# Patient Record
Sex: FEMALE | Race: WHITE | NOT HISPANIC OR LATINO | Employment: OTHER | URBAN - METROPOLITAN AREA
[De-identification: names, ages, dates, MRNs, and addresses within clinical notes are randomized per-mention and may not be internally consistent; named-entity substitution may affect disease eponyms.]

---

## 2023-05-10 DIAGNOSIS — E78.9 BORDERLINE HIGH CHOLESTEROL: ICD-10-CM

## 2023-05-10 NOTE — TELEPHONE ENCOUNTER
Patient needs a medication refill on Pravastatin 40 mg and levothyroxine 112 mcg sent to [address], [place] [address] phone number [phone]

## 2023-05-11 RX ORDER — LEVOTHYROXINE SODIUM 112 UG/1
112 TABLET ORAL DAILY
Qty: 90 TABLET | Refills: 0 | Status: SHIPPED | OUTPATIENT
Start: 2023-05-11

## 2023-05-11 RX ORDER — LEVOTHYROXINE SODIUM 112 UG/1
112 TABLET ORAL DAILY
COMMUNITY
End: 2023-05-11 | Stop reason: SDUPTHER

## 2023-05-11 RX ORDER — PRAVASTATIN SODIUM 40 MG/1
1 TABLET ORAL NIGHTLY
COMMUNITY
Start: 2022-09-02 | End: 2023-05-11 | Stop reason: SDUPTHER

## 2023-05-11 RX ORDER — PRAVASTATIN SODIUM 40 MG/1
40 TABLET ORAL NIGHTLY
Qty: 90 TABLET | Refills: 0 | Status: SHIPPED | OUTPATIENT
Start: 2023-05-11 | End: 2023-10-17 | Stop reason: SDUPTHER

## 2023-08-18 ENCOUNTER — APPOINTMENT (OUTPATIENT)
Dept: PRIMARY CARE | Facility: CLINIC | Age: 68
End: 2023-08-18
Payer: COMMERCIAL

## 2023-08-23 ENCOUNTER — APPOINTMENT (OUTPATIENT)
Dept: PRIMARY CARE | Facility: CLINIC | Age: 68
End: 2023-08-23
Payer: COMMERCIAL

## 2023-10-17 ENCOUNTER — OFFICE VISIT (OUTPATIENT)
Dept: PRIMARY CARE | Facility: CLINIC | Age: 68
End: 2023-10-17
Payer: COMMERCIAL

## 2023-10-17 VITALS
BODY MASS INDEX: 24.14 KG/M2 | HEART RATE: 75 BPM | HEIGHT: 62 IN | RESPIRATION RATE: 20 BRPM | WEIGHT: 131.2 LBS | OXYGEN SATURATION: 95 %

## 2023-10-17 DIAGNOSIS — F41.9 ANXIETY: ICD-10-CM

## 2023-10-17 DIAGNOSIS — E78.9 LIPID DISORDER: ICD-10-CM

## 2023-10-17 DIAGNOSIS — E78.9 BORDERLINE HIGH CHOLESTEROL: ICD-10-CM

## 2023-10-17 DIAGNOSIS — Z00.00 ROUTINE GENERAL MEDICAL EXAMINATION AT A HEALTH CARE FACILITY: Primary | ICD-10-CM

## 2023-10-17 PROBLEM — M79.10 MYALGIA: Status: ACTIVE | Noted: 2023-10-17

## 2023-10-17 PROBLEM — E03.9 ACQUIRED HYPOTHYROIDISM: Status: ACTIVE | Noted: 2023-10-17

## 2023-10-17 PROBLEM — R91.1 LUNG NODULE: Status: ACTIVE | Noted: 2023-10-17

## 2023-10-17 PROBLEM — R92.2 DENSE BREASTS: Status: ACTIVE | Noted: 2023-10-17

## 2023-10-17 PROBLEM — H35.369 DRUSEN OF EYE: Status: ACTIVE | Noted: 2023-10-17

## 2023-10-17 PROBLEM — D22.5 MELANOCYTIC NEVI OF TRUNK: Status: ACTIVE | Noted: 2021-02-08

## 2023-10-17 PROBLEM — N81.2 INCOMPLETE UTEROVAGINAL PROLAPSE: Status: ACTIVE | Noted: 2023-10-17

## 2023-10-17 PROBLEM — R05.8 UPPER AIRWAY COUGH SYNDROME: Status: ACTIVE | Noted: 2023-10-17

## 2023-10-17 PROBLEM — D22.60 MELANOCYTIC NEVI OF UNSPECIFIED UPPER LIMB, INCLUDING SHOULDER: Status: ACTIVE | Noted: 2021-02-08

## 2023-10-17 PROBLEM — D48.5 NEOPLASM OF UNCERTAIN BEHAVIOR OF SKIN: Status: ACTIVE | Noted: 2021-02-08

## 2023-10-17 PROBLEM — R20.2 NUMBNESS AND TINGLING OF FOOT: Status: ACTIVE | Noted: 2023-10-17

## 2023-10-17 PROBLEM — R20.0 NUMBNESS AND TINGLING OF FOOT: Status: ACTIVE | Noted: 2023-10-17

## 2023-10-17 PROBLEM — R30.0 DYSURIA: Status: ACTIVE | Noted: 2023-10-17

## 2023-10-17 PROBLEM — L57.9 SKIN CHANGES DUE TO CHRONIC EXPOSURE TO NONIONIZING RADIATION, UNSPECIFIED: Status: ACTIVE | Noted: 2021-02-08

## 2023-10-17 PROBLEM — S49.92XA INJURY OF LEFT SHOULDER: Status: ACTIVE | Noted: 2023-10-17

## 2023-10-17 PROBLEM — R31.29 MICROSCOPIC HEMATURIA: Status: ACTIVE | Noted: 2023-10-17

## 2023-10-17 PROBLEM — R92.30 DENSE BREASTS: Status: ACTIVE | Noted: 2023-10-17

## 2023-10-17 PROBLEM — E55.9 VITAMIN D DEFICIENCY: Status: ACTIVE | Noted: 2023-10-17

## 2023-10-17 PROBLEM — M81.0 OSTEOPOROSIS: Status: ACTIVE | Noted: 2023-10-17

## 2023-10-17 PROBLEM — N32.81 OAB (OVERACTIVE BLADDER): Status: ACTIVE | Noted: 2023-10-17

## 2023-10-17 PROBLEM — D22.70 MELANOCYTIC NEVI OF UNSPECIFIED LOWER LIMB, INCLUDING HIP: Status: ACTIVE | Noted: 2021-02-08

## 2023-10-17 PROBLEM — R06.02 SOB (SHORTNESS OF BREATH): Status: ACTIVE | Noted: 2023-10-17

## 2023-10-17 PROBLEM — E78.5 HYPERLIPIDEMIA: Status: ACTIVE | Noted: 2023-10-17

## 2023-10-17 PROBLEM — L81.4 OTHER MELANIN HYPERPIGMENTATION: Status: ACTIVE | Noted: 2021-02-08

## 2023-10-17 PROBLEM — N81.11 CYSTOCELE, MIDLINE: Status: ACTIVE | Noted: 2023-10-17

## 2023-10-17 PROBLEM — L82.1 OTHER SEBORRHEIC KERATOSIS: Status: ACTIVE | Noted: 2021-02-08

## 2023-10-17 PROBLEM — H93.12 SUBJECTIVE TINNITUS, LEFT: Status: ACTIVE | Noted: 2023-10-17

## 2023-10-17 PROBLEM — C73 CARCINOMA OF THYROID GLAND (MULTI): Status: ACTIVE | Noted: 2023-10-17

## 2023-10-17 PROBLEM — S46.002A INJURY OF LEFT ROTATOR CUFF: Status: ACTIVE | Noted: 2023-10-17

## 2023-10-17 PROBLEM — G47.00 INSOMNIA: Status: ACTIVE | Noted: 2023-10-17

## 2023-10-17 PROBLEM — I88.9 LYMPHADENITIS: Status: ACTIVE | Noted: 2023-10-17

## 2023-10-17 PROBLEM — D22.39 MELANOCYTIC NEVI OF OTHER PARTS OF FACE: Status: ACTIVE | Noted: 2021-02-08

## 2023-10-17 PROBLEM — D18.01 HEMANGIOMA OF SKIN AND SUBCUTANEOUS TISSUE: Status: ACTIVE | Noted: 2021-02-08

## 2023-10-17 PROBLEM — L57.0 ACTINIC KERATOSIS: Status: ACTIVE | Noted: 2021-02-08

## 2023-10-17 PROCEDURE — 1159F MED LIST DOCD IN RCRD: CPT | Performed by: INTERNAL MEDICINE

## 2023-10-17 PROCEDURE — 1036F TOBACCO NON-USER: CPT | Performed by: INTERNAL MEDICINE

## 2023-10-17 PROCEDURE — 1126F AMNT PAIN NOTED NONE PRSNT: CPT | Performed by: INTERNAL MEDICINE

## 2023-10-17 PROCEDURE — G0439 PPPS, SUBSEQ VISIT: HCPCS | Performed by: INTERNAL MEDICINE

## 2023-10-17 PROCEDURE — 1160F RVW MEDS BY RX/DR IN RCRD: CPT | Performed by: INTERNAL MEDICINE

## 2023-10-17 PROCEDURE — 81001 URINALYSIS AUTO W/SCOPE: CPT

## 2023-10-17 RX ORDER — ESCITALOPRAM OXALATE 10 MG/1
10 TABLET ORAL DAILY
Qty: 30 TABLET | Refills: 2 | Status: SHIPPED | OUTPATIENT
Start: 2023-10-17 | End: 2024-01-15

## 2023-10-17 RX ORDER — VIT C/E/ZN/COPPR/LUTEIN/ZEAXAN 250MG-90MG
CAPSULE ORAL
COMMUNITY
Start: 2013-11-12

## 2023-10-17 RX ORDER — ESCITALOPRAM OXALATE 10 MG/1
10 TABLET ORAL DAILY
COMMUNITY
End: 2023-10-17 | Stop reason: SDUPTHER

## 2023-10-17 RX ORDER — PRAVASTATIN SODIUM 40 MG/1
40 TABLET ORAL NIGHTLY
Qty: 90 TABLET | Refills: 3 | Status: SHIPPED | OUTPATIENT
Start: 2023-10-17 | End: 2024-10-16

## 2023-10-17 ASSESSMENT — PATIENT HEALTH QUESTIONNAIRE - PHQ9
SUM OF ALL RESPONSES TO PHQ9 QUESTIONS 1 AND 2: 0
2. FEELING DOWN, DEPRESSED OR HOPELESS: NOT AT ALL
1. LITTLE INTEREST OR PLEASURE IN DOING THINGS: NOT AT ALL

## 2023-10-17 ASSESSMENT — ENCOUNTER SYMPTOMS
LOSS OF SENSATION IN FEET: 0
OCCASIONAL FEELINGS OF UNSTEADINESS: 0
DEPRESSION: 0

## 2023-10-17 NOTE — PROGRESS NOTES
Chief Complaint: Medicare Wellness Exam/Comprehensive Problem Focused Follow Up and Physical Exam    Awaiting Bladder prolapse surgery with Dr Lionel Lopez at [facility]    HPI:    ATS    Hypothyroid    HLD      Active Problem List      Comprehensive Medical/Surgical/Social/Family History  Past Medical History:   Diagnosis Date    Acute sinusitis, unspecified 2013    Acute sinusitis    Acute upper respiratory infection, unspecified 2014    Acute URI    Acute vaginitis 2017    Acute vaginitis    Candidiasis, unspecified 2017    Yeast infection    Encounter for gynecological examination (general) (routine) without abnormal findings 10/10/2016    Pap smear, as part of routine gynecological examination    Localized swelling, mass and lump, neck     Neck mass    Other conditions influencing health status 2018    History of cough    Personal history of malignant neoplasm of thyroid     History of malignant neoplasm of thyroid    Personal history of other diseases of the nervous system and sense organs 2016    History of acute otitis media    Personal history of other diseases of the nervous system and sense organs 2016    History of serous otitis media    Personal history of other diseases of the respiratory system 10/31/2018    History of sinusitis    Personal history of other diseases of urinary system 2018    History of hematuria    Personal history of other specified conditions 2016    History of flank pain     Past Surgical History:   Procedure Laterality Date     SECTION, CLASSIC  10/27/2014     Section    COLONOSCOPY  10/10/2016    Complete Colonoscopy    RHINOPLASTY  10/10/2016    Rhinoplasty    TOTAL THYROIDECTOMY  2014    Thyroid Surgery Total Thyroidectomy     Social History     Social History Narrative    Not on file         Allergies and Medications  Rosuvastatin and Sulfa (sulfonamide antibiotics)  Current Outpatient Medications on File  "Prior to Visit   Medication Sig Dispense Refill    cholecalciferol (Vitamin D-3) 25 MCG (1000 UT) capsule Take by mouth.      cyanocobalamin (Vitamin B-12) 50 mcg tablet Take 1 tablet (50 mcg) by mouth once daily.      levothyroxine (Synthroid, Levoxyl) 112 mcg tablet Take 1 tablet (112 mcg) by mouth once daily. 90 tablet 0    [DISCONTINUED] escitalopram (Lexapro) 10 mg tablet Take 1 tablet (10 mg) by mouth once daily.      [DISCONTINUED] magnesium 30 mg tablet Take 1 tablet (30 mg) by mouth 2 times a day.      [DISCONTINUED] pravastatin (Pravachol) 40 mg tablet Take 1 tablet (40 mg) by mouth once daily at bedtime. 90 tablet 0     No current facility-administered medications on file prior to visit.       Medications and Supplements  prescribed by me and other practitioners or clinical pharmacist (such as prescriptions, OTC's, herbal therapies and supplements) were reviewed and documented in the medical record.    Tobacco/Alcohol/Opioid use, as well as Illicit Drug Use was screened for/reviewed and documented in Social History section and medication list as appropriate  Activities of Daily Living  In your present state of health, do you have any difficulty performing the following activities?:   Preparing food and eating?: No  Bathing yourself: No  Getting dressed: No  Using the toilet:No  Moving around from place to place: No  In the past year have you fallen or had a near fall?:No    Depression Screen  (Note: if answer to either of the following is \"Yes\", then a more complete depression screening is indicated)   Q1: Over the past two weeks, have you felt down, depressed or hopeless? No  Q2: Over the past two weeks, have you felt little interest or pleasure in doing things? No    Current exercise habits: Home exercise routine includes stretching.   Dietary issues discussed: Yes  Hearing difficulties: No  Safe in current home environment: yes  Visual Acuity assessed: no  Cognitive Impairment assessed: no   " "    Advance directives  Advanced Care Planning (including a Living Will, Healthcare POA, as well as specific end of life choices and/or directives), was discussed for approximately 16 minutes with the patient and/or surrogate, voluntarily, and documented in the medical record.     Cardiac Risk Assessment  Cardiovascular risk was discussed and, if needed, lifestyle modifications recommended, including nutritional choices, exercise, and elimination of habits contributing to risk. We agreed on a plan to reduce the current cardiovascular risk based on above discussion as needed.  Aspirin use/disuse was discussed after reviewing the updated guidelines below:    Consider low dose Aspirin ( mg) use if the benefit for cardiovascular disease prevention outweighs risk for bleeding complications.   In general, low dose ASA should be considered:  In patients WITHOUT prior MI/stroke/PAD (primary prevention):   a. Age <60: Use if 10-year cardiovascular disease risk >20%, with discussion of risks and benefits with patient  b. Age 60-<70: Use if 10-year cardiovascular disease risk >20% and low bleeding (e.g., gastrointenstinal) risk, with discussion of risks and benefits with patient  c. Age >=70: Do not use    In patients WITH prior MI/stroke/PAD (secondary prevention):   Generally use unless extremely high bleeding (e.g., gastrointenstinal) risk, with discussion of risks and benefits with patient    ROS otherwise negative aside from what was mentioned above in HPI.    Vitals  Pulse 75   Resp 20   Ht 1.563 m (5' 1.54\")   Wt 59.5 kg (131 lb 3.2 oz)   SpO2 95%   BMI 24.36 kg/m²   Body mass index is 24.36 kg/m².  Physical Exam  Gen: Alert, NAD  HEENT:  PERRLA, EOMI, conjunctiva and sclera normal in appearance. External auditory canals/TMs normal; Oral cavity and posterior pharynx without lesions/exudate  Neck:  Supple with FROM; No masses/nodes palpable; Thyroid nontender and without nodules; No ANNI  Respiratory:  Lungs " CTAB  Cardiovascular:  Heart RRR. No M/R/G. Peripheral pulses equal bilaterally  Abdomen:  Soft, nontender, BS present throughout; No R/G/R; No HSM or masses palpated  Extremities:  FROM all extremities; Muscle strength grossly normal with good tone  Neuro:  CN II-XII intact; Reflexes 2+/2+; Gross motor and sensory intact  Skin:  No suspicious lesions present  Breast: No masses, skin lesions or nipple discharges, no axillary lymphadenopathy    Assessment and Plan:  Problem List Items Addressed This Visit    None  Visit Diagnoses       Lipid disorder    -  Primary    Routine general medical examination at a health care facility        Relevant Medications    magnesium 30 mg tablet    Other Relevant Orders    TSH with reflex to Free T4 if abnormal    Lipid Panel    Comprehensive Metabolic Panel    CBC and Auto Differential    Vitamin D 25-Hydroxy,Total (for eval of Vitamin D levels)    Hemoglobin A1c    Urinalysis with Reflex Microscopic    Borderline high cholesterol        Relevant Medications    pravastatin (Pravachol) 40 mg tablet    Anxiety        Relevant Medications    escitalopram (Lexapro) 10 mg tablet          Thyroid [id number] with endocrinology continue with Synthroid 112 mcg daily    Hyperlipidemia    Anxiety/mood disorder-stable.  Continue with Lexapro Rx    CRC-current    Continue with current Rx    Follow up/ Call with any concerns    Follow up in 12 months /PRN        During the course of the visit the patient was educated and counseled about age appropriate screening and preventive services. Completed preventive screenings were documented in the chart and orders were placed for outstanding screenings/procedures as documented in the Assessment and Plan.      Patient Instructions (the written plan) was given to the patient at check out.      Leroy Rush MD

## 2023-10-18 LAB
APPEARANCE UR: CLEAR
BACTERIA #/AREA URNS AUTO: ABNORMAL /HPF
BILIRUB UR STRIP.AUTO-MCNC: NEGATIVE MG/DL
COLOR UR: YELLOW
GLUCOSE UR STRIP.AUTO-MCNC: NEGATIVE MG/DL
KETONES UR STRIP.AUTO-MCNC: NEGATIVE MG/DL
LEUKOCYTE ESTERASE UR QL STRIP.AUTO: NEGATIVE
NITRITE UR QL STRIP.AUTO: NEGATIVE
PH UR STRIP.AUTO: 7 [PH]
PROT UR STRIP.AUTO-MCNC: NEGATIVE MG/DL
RBC # UR STRIP.AUTO: ABNORMAL /UL
RBC #/AREA URNS AUTO: ABNORMAL /HPF
SP GR UR STRIP.AUTO: 1.01
SQUAMOUS #/AREA URNS AUTO: ABNORMAL /HPF
UROBILINOGEN UR STRIP.AUTO-MCNC: <2 MG/DL
WBC #/AREA URNS AUTO: ABNORMAL /HPF

## 2024-01-15 ENCOUNTER — TELEPHONE (OUTPATIENT)
Dept: PRIMARY CARE | Facility: CLINIC | Age: 69
End: 2024-01-15
Payer: COMMERCIAL

## 2024-01-15 DIAGNOSIS — B00.9 HSV (HERPES SIMPLEX VIRUS) INFECTION: ICD-10-CM

## 2024-01-15 DIAGNOSIS — U07.1 COVID: Primary | ICD-10-CM

## 2024-01-15 RX ORDER — NIRMATRELVIR AND RITONAVIR 300-100 MG
3 KIT ORAL 2 TIMES DAILY
Qty: 5 DOSE PACK | Refills: 0 | Status: SHIPPED | OUTPATIENT
Start: 2024-01-15 | End: 2024-01-20

## 2024-01-15 RX ORDER — VALACYCLOVIR HYDROCHLORIDE 1 G/1
1000 TABLET, FILM COATED ORAL 3 TIMES DAILY
Qty: 21 TABLET | Refills: 0 | Status: SHIPPED | OUTPATIENT
Start: 2024-01-15 | End: 2024-01-22

## 2024-01-15 NOTE — TELEPHONE ENCOUNTER
Patient is requesting a rx for valtrex she feel a cold sore coming on she states you always does it and she said can you call in 60 for her

## 2024-06-05 ENCOUNTER — OFFICE VISIT (OUTPATIENT)
Dept: ORTHOPEDIC SURGERY | Facility: CLINIC | Age: 69
End: 2024-06-05
Payer: COMMERCIAL

## 2024-06-05 ENCOUNTER — HOSPITAL ENCOUNTER (OUTPATIENT)
Dept: RADIOLOGY | Facility: CLINIC | Age: 69
Discharge: HOME | End: 2024-06-05
Payer: COMMERCIAL

## 2024-06-05 DIAGNOSIS — M54.16 LUMBAR RADICULOPATHY: ICD-10-CM

## 2024-06-05 PROCEDURE — 72110 X-RAY EXAM L-2 SPINE 4/>VWS: CPT | Performed by: RADIOLOGY

## 2024-06-05 PROCEDURE — 72120 X-RAY BEND ONLY L-S SPINE: CPT

## 2024-06-05 PROCEDURE — 1159F MED LIST DOCD IN RCRD: CPT | Performed by: ORTHOPAEDIC SURGERY

## 2024-06-05 PROCEDURE — 99203 OFFICE O/P NEW LOW 30 MIN: CPT | Performed by: ORTHOPAEDIC SURGERY

## 2024-06-05 PROCEDURE — 1036F TOBACCO NON-USER: CPT | Performed by: ORTHOPAEDIC SURGERY

## 2024-06-05 RX ORDER — MELOXICAM 15 MG/1
15 TABLET ORAL DAILY
Qty: 30 TABLET | Refills: 11 | Status: SHIPPED | OUTPATIENT
Start: 2024-06-05 | End: 2024-06-05 | Stop reason: SDUPTHER

## 2024-06-05 RX ORDER — MELOXICAM 15 MG/1
15 TABLET ORAL DAILY
Qty: 30 TABLET | Refills: 11 | Status: SHIPPED | OUTPATIENT
Start: 2024-06-05 | End: 2025-06-05

## 2024-06-05 NOTE — LETTER
June 5, 2024     Leroy Rush MD  [address] [place], [facility] [address]  [facility] [address]    Patient: Nancie Sands   YOB: 1955   Date of Visit: 6/5/2024       Dear Dr. Leroy Rush MD:    Thank you for referring Nancie Sands to me for evaluation. Below are my notes for this consultation.  If you have questions, please do not hesitate to call me. I look forward to following your patient along with you.       Sincerely,     William Baca MD      CC: No Recipients  ______________________________________________________________________________________    This pleasant 69-year-old woman is referred by Dr. Raji King.    2 months ago, while living in [place], she developed the abrupt onset of severe low back and right anterior thigh pain.  It has been episodic over time but at times is quite severe.    She did physical therapy, chiropractic treatment and osteopathic treatment.    She is currently doing physical therapy.    She has had periodic episodes of low back pain in the past.  No constitutional symptoms.    Family, social, and medical histories are obtained and reviewed.    30-point, patient-recorded Review of Systems is personally obtained and reviewed. Inclusive is no history of weight loss, change in appetite, recent change in activity level, change in bowel or bladder habits, fevers, chills, malaise, or night pain.    On exam healthy-appearing woman no acute distress.  Stable gait.  Painless motion both hips.  Her strength is intact in the lower extremities.    Plain films show a mild coronal plane deformity with degenerative disc disease.  She has a slight anterolisthesis of L4 on 5.    Impression: She has persistent right proximal lumbar radiculopathy.  Given the persistence of her symptoms, further imaging with an MRI of the lumbar spine is indicated.    She is continue to do therapy.  We will give a trial of meloxicam.    I will speak with her when her MRI is  done.    ** Dictated with voice recognition software and not immediately reviewed for errors in grammar and/or spelling **

## 2024-06-05 NOTE — PROGRESS NOTES
This pleasant 69-year-old woman is referred by Dr. Raji King.    2 months ago, while living in [place], she developed the abrupt onset of severe low back and right anterior thigh pain.  It has been episodic over time but at times is quite severe.    She did physical therapy, chiropractic treatment and osteopathic treatment.    She is currently doing physical therapy.    She has had periodic episodes of low back pain in the past.  No constitutional symptoms.    Family, social, and medical histories are obtained and reviewed.    30-point, patient-recorded Review of Systems is personally obtained and reviewed. Inclusive is no history of weight loss, change in appetite, recent change in activity level, change in bowel or bladder habits, fevers, chills, malaise, or night pain.    On exam healthy-appearing woman no acute distress.  Stable gait.  Painless motion both hips.  Her strength is intact in the lower extremities.    Plain films show a mild coronal plane deformity with degenerative disc disease.  She has a slight anterolisthesis of L4 on 5.    Impression: She has persistent right proximal lumbar radiculopathy.  Given the persistence of her symptoms, further imaging with an MRI of the lumbar spine is indicated.    She is continue to do therapy.  We will give a trial of meloxicam.    I will speak with her when her MRI is done.    ** Dictated with voice recognition software and not immediately reviewed for errors in grammar and/or spelling **

## 2024-06-19 ENCOUNTER — HOSPITAL ENCOUNTER (OUTPATIENT)
Dept: RADIOLOGY | Facility: CLINIC | Age: 69
Discharge: HOME | End: 2024-06-19
Payer: COMMERCIAL

## 2024-06-19 DIAGNOSIS — M54.16 LUMBAR RADICULOPATHY: ICD-10-CM

## 2024-06-19 PROCEDURE — 72148 MRI LUMBAR SPINE W/O DYE: CPT | Performed by: RADIOLOGY

## 2024-06-19 PROCEDURE — 72148 MRI LUMBAR SPINE W/O DYE: CPT

## 2024-06-25 ENCOUNTER — DOCUMENTATION (OUTPATIENT)
Dept: ORTHOPEDIC SURGERY | Facility: HOSPITAL | Age: 69
End: 2024-06-25
Payer: COMMERCIAL

## 2024-06-25 NOTE — PROGRESS NOTES
I called the patient to review her MRI.  There was no answer on her cell phone which was described as her preferable means of contact.

## 2024-10-30 ENCOUNTER — TELEPHONE (OUTPATIENT)
Dept: DERMATOLOGY | Facility: CLINIC | Age: 69
End: 2024-10-30
Payer: COMMERCIAL

## 2024-12-17 ENCOUNTER — APPOINTMENT (OUTPATIENT)
Dept: PRIMARY CARE | Facility: CLINIC | Age: 69
End: 2024-12-17
Payer: COMMERCIAL

## 2024-12-23 ENCOUNTER — APPOINTMENT (OUTPATIENT)
Dept: PRIMARY CARE | Facility: CLINIC | Age: 69
End: 2024-12-23
Payer: COMMERCIAL

## 2024-12-31 ENCOUNTER — APPOINTMENT (OUTPATIENT)
Dept: PRIMARY CARE | Facility: CLINIC | Age: 69
End: 2024-12-31
Payer: COMMERCIAL

## 2025-01-02 ENCOUNTER — APPOINTMENT (OUTPATIENT)
Dept: DERMATOLOGY | Facility: CLINIC | Age: 70
End: 2025-01-02
Payer: COMMERCIAL

## 2025-01-02 DIAGNOSIS — D22.9 MULTIPLE BENIGN NEVI: Primary | ICD-10-CM

## 2025-01-02 DIAGNOSIS — L82.1 SEBORRHEIC KERATOSIS: ICD-10-CM

## 2025-01-02 DIAGNOSIS — R20.2 NOTALGIA PARESTHETICA: ICD-10-CM

## 2025-01-02 DIAGNOSIS — D22.9 BENIGN NEVUS: ICD-10-CM

## 2025-01-02 DIAGNOSIS — L98.8 RHYTIDES: ICD-10-CM

## 2025-01-02 PROCEDURE — 99203 OFFICE O/P NEW LOW 30 MIN: CPT | Performed by: STUDENT IN AN ORGANIZED HEALTH CARE EDUCATION/TRAINING PROGRAM

## 2025-01-02 PROCEDURE — 1159F MED LIST DOCD IN RCRD: CPT | Performed by: STUDENT IN AN ORGANIZED HEALTH CARE EDUCATION/TRAINING PROGRAM

## 2025-01-02 ASSESSMENT — DERMATOLOGY PATIENT ASSESSMENT
DO YOU HAVE ANY NEW OR CHANGING LESIONS: NO
ARE YOU TRYING TO GET PREGNANT: NO
HAVE YOU HAD OR DO YOU HAVE A STAPH INFECTION: NO
ARE YOU AN ORGAN TRANSPLANT RECIPIENT: NO
ARE YOU ON BIRTH CONTROL: NO
DO YOU HAVE IRREGULAR MENSTRUAL CYCLES: NO
DO YOU USE SUNSCREEN: DAILY
DO YOU USE A TANNING BED: NO
HAVE YOU HAD OR DO YOU HAVE VASCULAR DISEASE: NO

## 2025-01-02 ASSESSMENT — ITCH NUMERIC RATING SCALE: HOW SEVERE IS YOUR ITCHING?: 0

## 2025-01-02 ASSESSMENT — DERMATOLOGY QUALITY OF LIFE (QOL) ASSESSMENT
RATE HOW BOTHERED YOU ARE BY EFFECTS OF YOUR SKIN PROBLEMS ON YOUR ACTIVITIES (EG, GOING OUT, ACCOMPLISHING WHAT YOU WANT, WORK ACTIVITIES OR YOUR RELATIONSHIPS WITH OTHERS): 0 - NEVER BOTHERED
DATE THE QUALITY-OF-LIFE ASSESSMENT WAS COMPLETED: 67207
RATE HOW EMOTIONALLY BOTHERED YOU ARE BY YOUR SKIN PROBLEM (FOR EXAMPLE, WORRY, EMBARRASSMENT, FRUSTRATION): 0 - NEVER BOTHERED
ARE THERE EXCLUSIONS OR EXCEPTIONS FOR THE QUALITY OF LIFE ASSESSMENT: NO
RATE HOW BOTHERED YOU ARE BY SYMPTOMS OF YOUR SKIN PROBLEM (EG, ITCHING, STINGING BURNING, HURTING OR SKIN IRRITATION): 0 - NEVER BOTHERED

## 2025-01-02 ASSESSMENT — PATIENT GLOBAL ASSESSMENT (PGA): PATIENT GLOBAL ASSESSMENT: PATIENT GLOBAL ASSESSMENT:  1 - CLEAR

## 2025-01-02 NOTE — PROGRESS NOTES
Subjective     Nancie Sands is a 69 y.o. female who presents for the following: Skin Check (FBSE, spot on back).     Review of Systems:  No other skin or systemic complaints other than what is documented elsewhere in the note.    The following portions of the chart were reviewed this encounter and updated as appropriate:         Skin Cancer History  No skin cancer on file.      Specialty Problems          Dermatology Problems    Actinic keratosis    Hemangioma of skin and subcutaneous tissue    Melanocytic nevi of other parts of face    Melanocytic nevi of trunk    Melanocytic nevi of unspecified lower limb, including hip    Melanocytic nevi of unspecified upper limb, including shoulder    Neoplasm of uncertain behavior of skin    Other melanin hyperpigmentation    Other seborrheic keratosis    Skin changes due to chronic exposure to nonionizing radiation, unspecified        Objective   Well appearing patient in no apparent distress; mood and affect are within normal limits.    A full examination was performed including scalp, head, eyes, ears, nose, lips, neck, chest, axillae, abdomen, back, buttocks, bilateral upper extremities, bilateral lower extremities, hands, feet, fingers, toes, fingernails, and toenails. All findings within normal limits unless otherwise noted below.    Assessment/Plan   1. Multiple benign nevi (2)  Left Upper Back; Generalized    Exam findings: Benign appearing macules and papules  Plan: monitor for any new or changing nevi. Notify me should this occur.  Over the counter use of sun screen product (30+ SPF with mineral sun screen) recommended      2. Notalgia paresthetica  Mid Back    3. Seborrheic keratosis    Benign appearing seborrheic papules on trunk/extremities  Reassured, benign      4. Benign nevus  Head - Anterior (Face)    reassured    5. Rhytides  Head - Anterior (Face)  Recommended over the counter differin 0.1% gel once weekly       Cosmetic visit in the future to talk  about age marks (seborrheic keratosis)

## 2025-01-08 ENCOUNTER — APPOINTMENT (OUTPATIENT)
Dept: PRIMARY CARE | Facility: CLINIC | Age: 70
End: 2025-01-08
Payer: COMMERCIAL

## 2025-01-08 ENCOUNTER — LAB (OUTPATIENT)
Dept: LAB | Facility: LAB | Age: 70
End: 2025-01-08
Payer: COMMERCIAL

## 2025-01-08 VITALS
WEIGHT: 137.6 LBS | HEIGHT: 61 IN | SYSTOLIC BLOOD PRESSURE: 110 MMHG | BODY MASS INDEX: 25.98 KG/M2 | DIASTOLIC BLOOD PRESSURE: 69 MMHG | TEMPERATURE: 97.6 F | HEART RATE: 79 BPM

## 2025-01-08 DIAGNOSIS — E03.9 ACQUIRED HYPOTHYROIDISM: ICD-10-CM

## 2025-01-08 DIAGNOSIS — R25.2 CRAMPING OF FEET: ICD-10-CM

## 2025-01-08 DIAGNOSIS — Z11.59 ENCOUNTER FOR HCV SCREENING TEST FOR LOW RISK PATIENT: ICD-10-CM

## 2025-01-08 DIAGNOSIS — C73 CARCINOMA OF THYROID GLAND (MULTI): ICD-10-CM

## 2025-01-08 DIAGNOSIS — Z78.0 POSTMENOPAUSAL: ICD-10-CM

## 2025-01-08 DIAGNOSIS — Z00.00 LABORATORY EXAMINATION ORDERED AS PART OF A ROUTINE GENERAL MEDICAL EXAMINATION: ICD-10-CM

## 2025-01-08 DIAGNOSIS — Z12.31 ENCOUNTER FOR SCREENING MAMMOGRAM FOR MALIGNANT NEOPLASM OF BREAST: ICD-10-CM

## 2025-01-08 DIAGNOSIS — Z00.00 ROUTINE GENERAL MEDICAL EXAMINATION AT A HEALTH CARE FACILITY: ICD-10-CM

## 2025-01-08 DIAGNOSIS — R25.2 CRAMPING OF FEET: Primary | ICD-10-CM

## 2025-01-08 DIAGNOSIS — Z76.89 ENCOUNTER TO ESTABLISH CARE WITH NEW DOCTOR: ICD-10-CM

## 2025-01-08 DIAGNOSIS — E78.5 HYPERLIPIDEMIA, UNSPECIFIED HYPERLIPIDEMIA TYPE: ICD-10-CM

## 2025-01-08 PROBLEM — R20.2 NOTALGIA PARESTHETICA: Status: ACTIVE | Noted: 2025-01-08

## 2025-01-08 LAB
ALBUMIN SERPL BCP-MCNC: 4.6 G/DL (ref 3.4–5)
ALP SERPL-CCNC: 61 U/L (ref 33–136)
ALT SERPL W P-5'-P-CCNC: 16 U/L (ref 7–45)
ANION GAP SERPL CALC-SCNC: 11 MMOL/L (ref 10–20)
AST SERPL W P-5'-P-CCNC: 20 U/L (ref 9–39)
BASOPHILS # BLD AUTO: 0.04 X10*3/UL (ref 0–0.1)
BASOPHILS NFR BLD AUTO: 0.7 %
BILIRUB SERPL-MCNC: 1.4 MG/DL (ref 0–1.2)
BUN SERPL-MCNC: 18 MG/DL (ref 6–23)
CALCIUM SERPL-MCNC: 8.9 MG/DL (ref 8.6–10.3)
CHLORIDE SERPL-SCNC: 106 MMOL/L (ref 98–107)
CHOLEST SERPL-MCNC: 244 MG/DL (ref 0–199)
CHOLESTEROL/HDL RATIO: 4.3
CO2 SERPL-SCNC: 28 MMOL/L (ref 21–32)
CREAT SERPL-MCNC: 0.72 MG/DL (ref 0.5–1.05)
EGFRCR SERPLBLD CKD-EPI 2021: >90 ML/MIN/1.73M*2
EOSINOPHIL # BLD AUTO: 0.08 X10*3/UL (ref 0–0.7)
EOSINOPHIL NFR BLD AUTO: 1.3 %
ERYTHROCYTE [DISTWIDTH] IN BLOOD BY AUTOMATED COUNT: 13.5 % (ref 11.5–14.5)
GLUCOSE SERPL-MCNC: 79 MG/DL (ref 74–99)
HCT VFR BLD AUTO: 39.9 % (ref 36–46)
HCV AB SER QL: NONREACTIVE
HDLC SERPL-MCNC: 56.3 MG/DL
HGB BLD-MCNC: 13.1 G/DL (ref 12–16)
IMM GRANULOCYTES # BLD AUTO: 0.01 X10*3/UL (ref 0–0.7)
IMM GRANULOCYTES NFR BLD AUTO: 0.2 % (ref 0–0.9)
LDLC SERPL CALC-MCNC: 154 MG/DL
LYMPHOCYTES # BLD AUTO: 2.22 X10*3/UL (ref 1.2–4.8)
LYMPHOCYTES NFR BLD AUTO: 37.1 %
MAGNESIUM SERPL-MCNC: 2.02 MG/DL (ref 1.6–2.4)
MCH RBC QN AUTO: 29.3 PG (ref 26–34)
MCHC RBC AUTO-ENTMCNC: 32.8 G/DL (ref 32–36)
MCV RBC AUTO: 89 FL (ref 80–100)
MONOCYTES # BLD AUTO: 0.34 X10*3/UL (ref 0.1–1)
MONOCYTES NFR BLD AUTO: 5.7 %
NEUTROPHILS # BLD AUTO: 3.3 X10*3/UL (ref 1.2–7.7)
NEUTROPHILS NFR BLD AUTO: 55 %
NON HDL CHOLESTEROL: 188 MG/DL (ref 0–149)
NRBC BLD-RTO: 0 /100 WBCS (ref 0–0)
PLATELET # BLD AUTO: 303 X10*3/UL (ref 150–450)
POTASSIUM SERPL-SCNC: 4.1 MMOL/L (ref 3.5–5.3)
PROT SERPL-MCNC: 7.4 G/DL (ref 6.4–8.2)
RBC # BLD AUTO: 4.47 X10*6/UL (ref 4–5.2)
SODIUM SERPL-SCNC: 141 MMOL/L (ref 136–145)
T4 FREE SERPL-MCNC: 0.91 NG/DL (ref 0.61–1.12)
TRIGL SERPL-MCNC: 169 MG/DL (ref 0–149)
TSH SERPL-ACNC: 4.92 MIU/L (ref 0.44–3.98)
VIT B12 SERPL-MCNC: 1452 PG/ML (ref 211–911)
VLDL: 34 MG/DL (ref 0–40)
WBC # BLD AUTO: 6 X10*3/UL (ref 4.4–11.3)

## 2025-01-08 PROCEDURE — 83735 ASSAY OF MAGNESIUM: CPT

## 2025-01-08 PROCEDURE — 84443 ASSAY THYROID STIM HORMONE: CPT

## 2025-01-08 PROCEDURE — 1036F TOBACCO NON-USER: CPT | Performed by: STUDENT IN AN ORGANIZED HEALTH CARE EDUCATION/TRAINING PROGRAM

## 2025-01-08 PROCEDURE — 1160F RVW MEDS BY RX/DR IN RCRD: CPT | Performed by: STUDENT IN AN ORGANIZED HEALTH CARE EDUCATION/TRAINING PROGRAM

## 2025-01-08 PROCEDURE — 99397 PER PM REEVAL EST PAT 65+ YR: CPT | Performed by: STUDENT IN AN ORGANIZED HEALTH CARE EDUCATION/TRAINING PROGRAM

## 2025-01-08 PROCEDURE — 84439 ASSAY OF FREE THYROXINE: CPT

## 2025-01-08 PROCEDURE — 86803 HEPATITIS C AB TEST: CPT

## 2025-01-08 PROCEDURE — 80061 LIPID PANEL: CPT

## 2025-01-08 PROCEDURE — 99214 OFFICE O/P EST MOD 30 MIN: CPT | Performed by: STUDENT IN AN ORGANIZED HEALTH CARE EDUCATION/TRAINING PROGRAM

## 2025-01-08 PROCEDURE — 80053 COMPREHEN METABOLIC PANEL: CPT

## 2025-01-08 PROCEDURE — 86800 THYROGLOBULIN ANTIBODY: CPT

## 2025-01-08 PROCEDURE — 36415 COLL VENOUS BLD VENIPUNCTURE: CPT

## 2025-01-08 PROCEDURE — 84432 ASSAY OF THYROGLOBULIN: CPT

## 2025-01-08 PROCEDURE — 1159F MED LIST DOCD IN RCRD: CPT | Performed by: STUDENT IN AN ORGANIZED HEALTH CARE EDUCATION/TRAINING PROGRAM

## 2025-01-08 PROCEDURE — 85025 COMPLETE CBC W/AUTO DIFF WBC: CPT

## 2025-01-08 PROCEDURE — 3008F BODY MASS INDEX DOCD: CPT | Performed by: STUDENT IN AN ORGANIZED HEALTH CARE EDUCATION/TRAINING PROGRAM

## 2025-01-08 PROCEDURE — 82607 VITAMIN B-12: CPT

## 2025-01-08 RX ORDER — LEVOTHYROXINE SODIUM 112 UG/1
112 TABLET ORAL DAILY
Qty: 90 TABLET | Refills: 3 | Status: SHIPPED | OUTPATIENT
Start: 2025-01-08

## 2025-01-08 RX ORDER — PRAVASTATIN SODIUM 40 MG/1
40 TABLET ORAL NIGHTLY
Qty: 90 TABLET | Refills: 3 | Status: SHIPPED | OUTPATIENT
Start: 2025-01-08 | End: 2026-01-08

## 2025-01-08 ASSESSMENT — ENCOUNTER SYMPTOMS
COUGH: 0
DIZZINESS: 0
VOMITING: 0
COLOR CHANGE: 0
DIFFICULTY URINATING: 0
WHEEZING: 0
DIARRHEA: 0
HEADACHES: 0
SHORTNESS OF BREATH: 0
FATIGUE: 0
ABDOMINAL PAIN: 0
ADENOPATHY: 0
NAUSEA: 0
CHILLS: 0
CONSTIPATION: 0
FEVER: 0

## 2025-01-08 NOTE — ASSESSMENT & PLAN NOTE
This was surgically removed, follows with endocrinology. Recheck TSH, thyroglobin, T4. Refilled synthroid. Follow up with endo for monitoring, sees them in the summer.

## 2025-01-08 NOTE — PROGRESS NOTES
"  [facility]  [address], [address]  [place] [address]    Subjective     Patient ID: Nancie Sands is a 69 y.o. female who presents for  Establish Care     Needs DEXA. Has felt she is shrinking.     Saw derm. He said her derm said it was neurologic itching.     Having leg cramps in the feet. No twitching of the muscles. Was told has low vit B12 in [place].        Review of Systems   Constitutional:  Negative for chills, fatigue and fever.   HENT:  Negative for congestion.    Eyes:  Negative for visual disturbance.   Respiratory:  Negative for cough, shortness of breath and wheezing.    Cardiovascular:  Negative for chest pain.   Gastrointestinal:  Negative for abdominal pain, constipation, diarrhea, nausea and vomiting.   Genitourinary:  Negative for difficulty urinating.   Musculoskeletal:  Negative for gait problem.   Skin:  Negative for color change.   Neurological:  Negative for dizziness, syncope and headaches.   Hematological:  Negative for adenopathy.       Social History     Tobacco Use    Smoking status: Never    Smokeless tobacco: Never   Substance Use Topics    Alcohol use: Yes     Alcohol/week: 2.0 standard drinks of alcohol     Types: 1 Glasses of wine, 1 Standard drinks or equivalent per week     Comment: per week    Drug use: Never     No family history on file.  Allergies   Allergen Reactions    Rosuvastatin Other    Sulfa (Sulfonamide Antibiotics) Rash       /69 (BP Location: Left arm, Patient Position: Sitting, BP Cuff Size: Adult)   Pulse 79   Temp 36.4 °C (97.6 °F) (Temporal)   Ht 1.549 m (5' 1\")   Wt 62.4 kg (137 lb 9.6 oz)   BMI 26.00 kg/m²      Objective   Physical Exam  Vitals reviewed.   Constitutional:       Appearance: Normal appearance.   Eyes:      Extraocular Movements: Extraocular movements intact.      Pupils: Pupils are equal, round, and reactive to light.   Cardiovascular:      Rate and Rhythm: Normal rate and regular rhythm.      " "Pulses: Normal pulses.      Heart sounds: Normal heart sounds.   Pulmonary:      Effort: Pulmonary effort is normal.      Breath sounds: Normal breath sounds.   Abdominal:      General: Abdomen is flat. Bowel sounds are normal.      Palpations: Abdomen is soft.   Musculoskeletal:         General: No swelling or tenderness. Normal range of motion.      Cervical back: Normal range of motion and neck supple. No rigidity or tenderness.      Right lower leg: No edema.      Left lower leg: No edema.      Comments: No spasm or clonus of the leg.   Lymphadenopathy:      Cervical: No cervical adenopathy.   Skin:     General: Skin is warm and dry.      Coloration: Skin is not jaundiced or pale.      Findings: No bruising, erythema or rash.   Neurological:      General: No focal deficit present.      Mental Status: She is alert.   Psychiatric:         Mood and Affect: Mood normal.         Behavior: Behavior normal.           Labs:   Lab Results   Component Value Date    WBC 7.7 08/30/2022    HGB 13.3 08/30/2022    HCT 41.4 08/30/2022     08/30/2022    TSH 0.04 (L) 08/30/2022     Lab Results   Component Value Date     08/30/2022    K 4.1 08/30/2022     08/30/2022    BUN 16 08/30/2022    CREATININE 0.72 08/30/2022    GLUCOSE 86 08/30/2022    CALCIUM 8.7 08/30/2022    PROT 7.5 08/30/2022    BILITOT 1.5 (H) 08/30/2022    ALKPHOS 56 08/30/2022    AST 17 08/30/2022    ALT 14 08/30/2022     Lab Results   Component Value Date    CHOL 324 (H) 08/30/2022    CHOL 234 (H) 05/05/2021    CHOL 344 (H) 08/26/2020      Lab Results   Component Value Date    TRIG 198 (H) 08/30/2022    TRIG 97 05/05/2021    TRIG 234 (H) 08/26/2020      Lab Results   Component Value Date    HDL 53.5 08/30/2022    HDL 49.5 05/05/2021    HDL 52.8 08/26/2020     No results found for: \"LDLCALC\"   Lab Results   Component Value Date    VLDL 40 08/30/2022    VLDL 19 05/05/2021    VLDL 47 (H) 08/26/2020    No components found for: \"CHOLHDLRATI0\"    No " data recorded    Imaging/Testing: MR lumbar spine wo IV contrast  Narrative: Interpreted By:  Daphne Almazan,   STUDY:  MR LUMBAR SPINE WO IV CONTRAST;  6/19/2024 9:09 am      INDICATION:  Signs/Symptoms:lumbar radiculopathy.      COMPARISON:  Radiographs June 5, 2024      ACCESSION NUMBER(S):  [id number]      ORDERING CLINICIAN:  JOSE LITTLE      TECHNIQUE:  Sagittal T1, T2, STIR, axial T1 and T2 weighted images of the lumbar  spine were acquired.      FINDINGS:  Alignment: Limited coronal  images demonstrate a dextroconvex  curvature of the lumbar spine. There is minimal, grade 1  retrolisthesis of L2 on L3. There is grade 1 anterolisthesis of L4 on  L5 and to a lesser extent of L5 on S1.      Vertebrae/Intervertebral Discs: The vertebral bodies demonstrate  expected height.  The marrow signal is somewhat inhomogeneous  throughout on T1 and T2 weighted imaging which is nonspecific. There  are mild, Modic type 1 degenerative endplate signal changes at L2-3.  There are scattered ovoid foci of increased signal on T1 and T2  weighted imaging favored to represent hemangiomas or focal fatty  rests. There is multilevel disc desiccation and endplate spurring.  Loss of disc height is most pronounced at L2-3.      Conus: The lower thoracic cord appears unremarkable. The conus  terminates at L1-2.      T12-L1:  There is no significant central canal or neural foraminal  stenosis. L1-2: There is mild disc bulging without central canal or  neuroforaminal narrowing. L2-3: There is diffuse disc bulging and  endplate spurring without central canal stenosis. There is mild, left  greater than right neuroforaminal narrowing due to asymmetric  endplate spurring and bulging disc on the left. This may contact the  exiting left L2 nerve root. L3-4: Subtle disc bulging and  degenerative facet arthropathy and ligamentum flavum hypertrophy on  the left do not narrow the central canal. There is at most minimal  neuroforaminal  encroachment on the left. L4-5:  Left-greater-than-right facet and ligamentum flavum hypertrophy as  well as circumferential disc bulging and the anterolisthesis  contribute to mild left-sided neuroforaminal stenosis. There is no  significant narrowing of the spinal canal or right neuroforamen.  L5-S1: There is right greater than left degenerative facet  arthropathy and minimal disc bulging without central canal or  neuroforaminal stenosis.      There are degenerative changes of the sacroiliac joints.      Impression:     1. Degenerative changes of the lumbar spine most pronounced at L2-3  and L4-5.  2. Grade 1 anterolisthesis of L4 on L5 and to a lesser extent of L5  on S1.          MACRO:  None      Signed by: Daphne Almazan 6/19/2024 10:09 AM  Dictation workstation:   [id number]    Assessment/Plan   Problem List Items Addressed This Visit       Acquired hypothyroidism     Chronic, stable, initial. Recheck TSH, thyroglobin, T4. Refilled synthroid. Follow up with endo.         Relevant Orders    Thyroglobulin and Antithyroglobulin    TSH    T4, free    Carcinoma of thyroid gland (Multi)     This was surgically removed, follows with endocrinology. Recheck TSH, thyroglobin, T4. Refilled synthroid. Follow up with endo for monitoring, sees them in the summer.         Relevant Medications    levothyroxine (Synthroid, Levoxyl) 112 mcg tablet    Other Relevant Orders    Thyroglobulin and Antithyroglobulin    TSH    T4, free    Hyperlipidemia     Chronic, stable, initial. Refilled meds. Recheck lipid.         Relevant Medications    pravastatin (Pravachol) 40 mg tablet    Other Relevant Orders    Lipid Panel     Other Visit Diagnoses       Cramping of feet    -  Primary    Relevant Orders    CBC and Auto Differential    Comprehensive metabolic panel    Vitamin B12    Magnesium    Encounter for HCV screening test for low risk patient        Relevant Orders    Hepatitis C antibody    Borderline high cholesterol         Postmenopausal        Relevant Orders    XR DEXA bone density    Encounter for screening mammogram for malignant neoplasm of breast        Relevant Orders    BI mammo bilateral screening tomosynthesis    Routine general medical examination at a health care facility        Encounter to establish care with new doctor        Laboratory examination ordered as part of a routine general medical examination        Relevant Orders    Lipid Panel          Foot cramping - new, uncontrolled, initial encounter. Will check electrolytes vs endo vs vitamin b12. May benefit from OTC Mg if everything is normal.    As part of today's Preventative Visit, an age and gender-appropriate history and physical was performed, as documented below. Counseling and anticipatory guidance were performed, and risk factor reduction interventions (including [place] Preventative Services Task Force recommended screening tests) were utilized/ordered as outlined in the above Assessment and Plan. All patient medications were reviewed, and refilled if necessary. Patient and I discussed diet/nutrition, lifestyle modifications, safety, medication indications and side effects, and health goals.    Patient and I discussed diet/nutrition, lifestyle modifications, safety, medication indications and side effects, and health goals.    orders and follow up as documented in EMR, lab results reviewed with patient, repeat labs ordered prior to next appointment, reviewed compliance with lifestyle measures, reviewed diet, exercise and weight control, reviewed medications and side effects in detail     Return visit in 6 months.  Return if problem recurs,  worsens, or new problem develops.           HERO READ MD, [facility]  Department of Family Medicine of [facility] - Primary Care

## 2025-01-10 ENCOUNTER — HOSPITAL ENCOUNTER (OUTPATIENT)
Dept: RADIOLOGY | Facility: CLINIC | Age: 70
Discharge: HOME | End: 2025-01-10
Payer: COMMERCIAL

## 2025-01-10 ENCOUNTER — OFFICE VISIT (OUTPATIENT)
Dept: PRIMARY CARE | Facility: CLINIC | Age: 70
End: 2025-01-10
Payer: COMMERCIAL

## 2025-01-10 VITALS — HEIGHT: 61 IN | WEIGHT: 137.57 LBS | BODY MASS INDEX: 25.97 KG/M2

## 2025-01-10 DIAGNOSIS — E78.5 HYPERLIPIDEMIA, UNSPECIFIED HYPERLIPIDEMIA TYPE: ICD-10-CM

## 2025-01-10 DIAGNOSIS — Z12.31 ENCOUNTER FOR SCREENING MAMMOGRAM FOR MALIGNANT NEOPLASM OF BREAST: ICD-10-CM

## 2025-01-10 DIAGNOSIS — Z23 ENCOUNTER FOR IMMUNIZATION: Primary | ICD-10-CM

## 2025-01-10 DIAGNOSIS — E03.9 ACQUIRED HYPOTHYROIDISM: ICD-10-CM

## 2025-01-10 DIAGNOSIS — Z78.0 POSTMENOPAUSAL: ICD-10-CM

## 2025-01-10 DIAGNOSIS — R17 ELEVATED BILIRUBIN: ICD-10-CM

## 2025-01-10 LAB
THYROGLOB AB SERPL-ACNC: <1 IU/ML (ref 0–0.9)
THYROGLOB SERPL-MCNC: <0.1 NG/ML (ref 1.5–38.5)

## 2025-01-10 PROCEDURE — 1159F MED LIST DOCD IN RCRD: CPT | Performed by: STUDENT IN AN ORGANIZED HEALTH CARE EDUCATION/TRAINING PROGRAM

## 2025-01-10 PROCEDURE — 1036F TOBACCO NON-USER: CPT | Performed by: STUDENT IN AN ORGANIZED HEALTH CARE EDUCATION/TRAINING PROGRAM

## 2025-01-10 PROCEDURE — 77080 DXA BONE DENSITY AXIAL: CPT

## 2025-01-10 PROCEDURE — 77067 SCR MAMMO BI INCL CAD: CPT

## 2025-01-10 PROCEDURE — 99212 OFFICE O/P EST SF 10 MIN: CPT | Performed by: STUDENT IN AN ORGANIZED HEALTH CARE EDUCATION/TRAINING PROGRAM

## 2025-01-10 PROCEDURE — 90662 IIV NO PRSV INCREASED AG IM: CPT | Performed by: STUDENT IN AN ORGANIZED HEALTH CARE EDUCATION/TRAINING PROGRAM

## 2025-01-10 PROCEDURE — 90471 IMMUNIZATION ADMIN: CPT | Performed by: STUDENT IN AN ORGANIZED HEALTH CARE EDUCATION/TRAINING PROGRAM

## 2025-01-10 PROCEDURE — 1160F RVW MEDS BY RX/DR IN RCRD: CPT | Performed by: STUDENT IN AN ORGANIZED HEALTH CARE EDUCATION/TRAINING PROGRAM

## 2025-01-10 NOTE — PROGRESS NOTES
[facility] - Primary Care  [address], [address]  [place] [address]    Subjective     Patient ID: Nancie Sands is a 69 y.o. female who presents for  Flu Vaccine     Here for flu shot.    Go over labs.    No other complaints.    Going to [place] for a few months next week.    Review of Systems   All other systems reviewed and are negative.      Social History     Tobacco Use    Smoking status: Never    Smokeless tobacco: Never   Substance Use Topics    Alcohol use: Yes     Alcohol/week: 2.0 standard drinks of alcohol     Types: 1 Glasses of wine, 1 Standard drinks or equivalent per week     Comment: per week    Drug use: Never     Family History   Problem Relation Name Age of Onset    Breast cancer Mother's Sister  30    Breast cancer Father's Sister  50    Breast cancer Cousin  50    Breast cancer Cousin  27     Allergies   Allergen Reactions    Rosuvastatin Other    Sulfa (Sulfonamide Antibiotics) Rash       There were no vitals taken for this visit.     Objective   Physical Exam  Constitutional:       Appearance: Normal appearance.   Neurological:      General: No focal deficit present.      Mental Status: She is alert and oriented to person, place, and time.   Psychiatric:         Mood and Affect: Mood normal.         Behavior: Behavior normal.           Labs:   Lab Results   Component Value Date    WBC 6.0 01/08/2025    HGB 13.1 01/08/2025    HCT 39.9 01/08/2025     01/08/2025    TSH 4.92 (H) 01/08/2025     Lab Results   Component Value Date     01/08/2025    K 4.1 01/08/2025     01/08/2025    BUN 18 01/08/2025    CREATININE 0.72 01/08/2025    GLUCOSE 79 01/08/2025    CALCIUM 8.9 01/08/2025    PROT 7.4 01/08/2025    BILITOT 1.4 (H) 01/08/2025    ALKPHOS 61 01/08/2025    AST 20 01/08/2025    ALT 16 01/08/2025     Lab Results   Component Value Date    CHOL 244 (H) 01/08/2025    CHOL 324 (H) 08/30/2022    CHOL 234 (H) 05/05/2021      Lab Results   Component Value Date     "TRIG 169 (H) 01/08/2025    TRIG 198 (H) 08/30/2022    TRIG 97 05/05/2021      Lab Results   Component Value Date    HDL 56.3 01/08/2025    HDL 53.5 08/30/2022    HDL 49.5 05/05/2021     Lab Results   Component Value Date    LDLCALC 154 (H) 01/08/2025      Lab Results   Component Value Date    VLDL 34 01/08/2025    VLDL 40 08/30/2022    VLDL 19 05/05/2021    No components found for: \"CHOLHDLRATI0\"    No data recorded    Imaging/Testing: MR lumbar spine wo IV contrast  Narrative: Interpreted By:  Daphne Almazan,   STUDY:  MR LUMBAR SPINE WO IV CONTRAST;  6/19/2024 9:09 am      INDICATION:  Signs/Symptoms:lumbar radiculopathy.      COMPARISON:  Radiographs June 5, 2024      ACCESSION NUMBER(S):  [id number]      ORDERING CLINICIAN:  JOSE LITTLE      TECHNIQUE:  Sagittal T1, T2, STIR, axial T1 and T2 weighted images of the lumbar  spine were acquired.      FINDINGS:  Alignment: Limited coronal  images demonstrate a dextroconvex  curvature of the lumbar spine. There is minimal, grade 1  retrolisthesis of L2 on L3. There is grade 1 anterolisthesis of L4 on  L5 and to a lesser extent of L5 on S1.      Vertebrae/Intervertebral Discs: The vertebral bodies demonstrate  expected height.  The marrow signal is somewhat inhomogeneous  throughout on T1 and T2 weighted imaging which is nonspecific. There  are mild, Modic type 1 degenerative endplate signal changes at L2-3.  There are scattered ovoid foci of increased signal on T1 and T2  weighted imaging favored to represent hemangiomas or focal fatty  rests. There is multilevel disc desiccation and endplate spurring.  Loss of disc height is most pronounced at L2-3.      Conus: The lower thoracic cord appears unremarkable. The conus  terminates at L1-2.      T12-L1:  There is no significant central canal or neural foraminal  stenosis. L1-2: There is mild disc bulging without central canal or  neuroforaminal narrowing. L2-3: There is diffuse disc bulging and  endplate " spurring without central canal stenosis. There is mild, left  greater than right neuroforaminal narrowing due to asymmetric  endplate spurring and bulging disc on the left. This may contact the  exiting left L2 nerve root. L3-4: Subtle disc bulging and  degenerative facet arthropathy and ligamentum flavum hypertrophy on  the left do not narrow the central canal. There is at most minimal  neuroforaminal encroachment on the left. L4-5:  Left-greater-than-right facet and ligamentum flavum hypertrophy as  well as circumferential disc bulging and the anterolisthesis  contribute to mild left-sided neuroforaminal stenosis. There is no  significant narrowing of the spinal canal or right neuroforamen.  L5-S1: There is right greater than left degenerative facet  arthropathy and minimal disc bulging without central canal or  neuroforaminal stenosis.      There are degenerative changes of the sacroiliac joints.      Impression:     1. Degenerative changes of the lumbar spine most pronounced at L2-3  and L4-5.  2. Grade 1 anterolisthesis of L4 on L5 and to a lesser extent of L5  on S1.          MACRO:  None      Signed by: Daphne Almazan 6/19/2024 10:09 AM  Dictation workstation:   [id number]    Assessment/Plan   Problem List Items Addressed This Visit       Acquired hypothyroidism    Hyperlipidemia     Other Visit Diagnoses       Encounter for immunization    -  Primary    Relevant Orders    Flu vaccine, trivalent, preservative free, HIGH-DOSE, age 65y+ (Fluzone) (Completed)    Elevated bilirubin              Discussed lab results. TSH is mildly above upper limit of normal but she does not have any sx, her T4 is normal. Has hx of thyroidectomy. Will recheck.    Elevated bili, will recheck CMP.    Recheck lipid.     She said she will do this in [place] since she will not be here next month.    Advised her to follow-up after she comes back from [place].    Patient and I discussed diet/nutrition, lifestyle modifications, safety,  medication indications and side effects, and health goals.    current treatment plan is effective, no change in therapy, orders and follow up as documented in EMR, lab results reviewed with patient     Return after she comes back from [place] in a few months, retest TSH, CMP, and lipid.           HERO READ MD, [facility]  Department of Family Medicine of [facility] - Primary Care

## 2025-01-13 ENCOUNTER — TELEPHONE (OUTPATIENT)
Dept: PRIMARY CARE | Facility: CLINIC | Age: 70
End: 2025-01-13
Payer: COMMERCIAL

## 2025-01-13 NOTE — TELEPHONE ENCOUNTER
PT called in and stated that she hurt her back working out and is in very bad pain and leaves for out of town Thursday. PT stated that she would like a non steroid muscle relaxer sent to her pharmacy please advise

## 2025-01-14 ENCOUNTER — OFFICE VISIT (OUTPATIENT)
Dept: PRIMARY CARE | Facility: CLINIC | Age: 70
End: 2025-01-14
Payer: COMMERCIAL

## 2025-01-14 VITALS
BODY MASS INDEX: 25.63 KG/M2 | HEART RATE: 81 BPM | SYSTOLIC BLOOD PRESSURE: 105 MMHG | TEMPERATURE: 97.5 F | DIASTOLIC BLOOD PRESSURE: 63 MMHG | OXYGEN SATURATION: 95 % | WEIGHT: 135.6 LBS

## 2025-01-14 DIAGNOSIS — R25.2 BILATERAL LEG CRAMPS: ICD-10-CM

## 2025-01-14 DIAGNOSIS — T14.8XXA MUSCLE STRAIN: Primary | ICD-10-CM

## 2025-01-14 PROCEDURE — 99214 OFFICE O/P EST MOD 30 MIN: CPT | Performed by: STUDENT IN AN ORGANIZED HEALTH CARE EDUCATION/TRAINING PROGRAM

## 2025-01-14 PROCEDURE — 1160F RVW MEDS BY RX/DR IN RCRD: CPT | Performed by: STUDENT IN AN ORGANIZED HEALTH CARE EDUCATION/TRAINING PROGRAM

## 2025-01-14 PROCEDURE — 1036F TOBACCO NON-USER: CPT | Performed by: STUDENT IN AN ORGANIZED HEALTH CARE EDUCATION/TRAINING PROGRAM

## 2025-01-14 PROCEDURE — 1159F MED LIST DOCD IN RCRD: CPT | Performed by: STUDENT IN AN ORGANIZED HEALTH CARE EDUCATION/TRAINING PROGRAM

## 2025-01-14 RX ORDER — ACETAMINOPHEN 500 MG
1000 TABLET ORAL EVERY 6 HOURS PRN
Qty: 30 TABLET | Refills: 0 | Status: SHIPPED | OUTPATIENT
Start: 2025-01-14 | End: 2025-01-24

## 2025-01-14 RX ORDER — TIZANIDINE 4 MG/1
4 TABLET ORAL EVERY 6 HOURS PRN
Qty: 30 TABLET | Refills: 0 | Status: SHIPPED | OUTPATIENT
Start: 2025-01-14 | End: 2025-01-24

## 2025-01-14 NOTE — PROGRESS NOTES
[facility] - Primary Care  [address], [address]  [place] [address]    Subjective     Patient ID: Nancie Sands is a 69 y.o. female who presents for  Establish Care and Back Pain (Lower back)     Last week started to work out again. She started to working out excessively and hurt her back. She went to a , started out slow but then the PT was out for surgery. She went to a new . She did weights and a lot of work with that person and then the next day she could not move the next day. She was hunched over, a lot of discomfort. She went to her physical therapist they tried dry needling, stretches, core exercises.     Still having cramps in her legs.  They are bothersome.        Review of Systems   All other systems reviewed and are negative.      Social History     Tobacco Use    Smoking status: Never    Smokeless tobacco: Never   Substance Use Topics    Alcohol use: Yes     Alcohol/week: 2.0 standard drinks of alcohol     Types: 1 Glasses of wine, 1 Standard drinks or equivalent per week     Comment: per week    Drug use: Never     Family History   Problem Relation Name Age of Onset    Breast cancer Mother's Sister  30    Breast cancer Father's Sister  50    Breast cancer Cousin  50    Breast cancer Cousin  27     Allergies   Allergen Reactions    Rosuvastatin Other    Sulfa (Sulfonamide Antibiotics) Rash       /63 (BP Location: Right arm, Patient Position: Sitting, BP Cuff Size: Adult)   Pulse 81   Temp 36.4 °C (97.5 °F) (Temporal)   Wt 61.5 kg (135 lb 9.6 oz)   SpO2 95%   BMI 25.63 kg/m²      Objective   Physical Exam  Vitals reviewed.   Constitutional:       Appearance: Normal appearance.   Eyes:      Extraocular Movements: Extraocular movements intact.      Pupils: Pupils are equal, round, and reactive to light.   Cardiovascular:      Rate and Rhythm: Regular rhythm.      Pulses: Normal pulses.      Heart sounds: Normal heart sounds.   Pulmonary:  "     Effort: Pulmonary effort is normal.      Breath sounds: Normal breath sounds.   Musculoskeletal:         General: Tenderness (diffusely on the midback musculature b/l) present. Normal range of motion.      Cervical back: Normal range of motion and neck supple.   Neurological:      General: No focal deficit present.      Mental Status: She is alert.   Psychiatric:         Mood and Affect: Mood normal.         Behavior: Behavior normal.           Labs:   Lab Results   Component Value Date    WBC 6.0 01/08/2025    HGB 13.1 01/08/2025    HCT 39.9 01/08/2025     01/08/2025    TSH 4.92 (H) 01/08/2025     Lab Results   Component Value Date     01/08/2025    K 4.1 01/08/2025     01/08/2025    BUN 18 01/08/2025    CREATININE 0.72 01/08/2025    GLUCOSE 79 01/08/2025    CALCIUM 8.9 01/08/2025    PROT 7.4 01/08/2025    BILITOT 1.4 (H) 01/08/2025    ALKPHOS 61 01/08/2025    AST 20 01/08/2025    ALT 16 01/08/2025     Lab Results   Component Value Date    CHOL 244 (H) 01/08/2025    CHOL 324 (H) 08/30/2022    CHOL 234 (H) 05/05/2021      Lab Results   Component Value Date    TRIG 169 (H) 01/08/2025    TRIG 198 (H) 08/30/2022    TRIG 97 05/05/2021      Lab Results   Component Value Date    HDL 56.3 01/08/2025    HDL 53.5 08/30/2022    HDL 49.5 05/05/2021     Lab Results   Component Value Date    LDLCALC 154 (H) 01/08/2025      Lab Results   Component Value Date    VLDL 34 01/08/2025    VLDL 40 08/30/2022    VLDL 19 05/05/2021    No components found for: \"CHOLHDLRATI0\"    No data recorded    Imaging/Testing: XR DEXA bone density  Narrative: Interpreted By:  Connie Dey,   STUDY:  DEXA BONE DENSITY1/10/2025 3:50 pm      INDICATION:  Signs/Symptoms:screening.  The patient is a 68 y/o  year old F.      ,Z78.0 Asymptomatic menopausal state      COMPARISON:  05/24/2019      ACCESSION NUMBER(S):  [id number]      ORDERING CLINICIAN:  HERO READ      TECHNIQUE:  DEXA BONE DENSITY      FINDINGS:  SPINE L1-L4  Bone " Mineral Density: 0.896  T-Score -1.4  Z-Score 0.7      Bone Mineral Density change vs baseline:  6.4  Bone Mineral Density change vs previous: 5.0      RIGHT FEMUR -TOTAL  Bone Mineral Density: 0.911  T-Score -0.3   Z-Score 1.2      Bone Mineral Density change vs baseline:  11.6  Bone Mineral Density change vs previous: 11.6      RIGHT FEMUR -NECK  Bone Mineral Density: 0.707  T-Score -1.3   Z-Score  0.5      Bone Mineral Density change vs baseline: 6.0  Bone Mineral Density change vs previous: 6.0              World Health Organization (WHO) criteria for post-menopausal,   Women:  Normal:         T-score at or above -1 SD  Osteopenia:   T-score between -1 and -2.5 SD  Osteoporosis: T-score at or below -2.5 SD          10-year Fracture Risk:  Major Osteoporotic Fracture  9.5  Hip Fracture                        1.2      Note:  If no FRAX score is reported, it is because:  Some T-score for Spine Total or Hip Total or Femoral Neck at or below  -2.5      Impression: DEXA:  There is osteopenia of the right femur and spine.      All images and detailed analysis are available on the  Radiology  PACS.      MACRO:  None      Signed by: Connie Dey 1/10/2025 4:16 PM  Dictation workstation:   [id number]    Assessment/Plan   Problem List Items Addressed This Visit    None  Visit Diagnoses       Muscle strain    -  Primary    Relevant Medications    tiZANidine (Zanaflex) 4 mg tablet    acetaminophen (Tylenol Extra Strength) 500 mg tablet    Bilateral leg cramps              Muscle strain-acute issue, uncontrolled, initial encounter.  Continue with physical therapy.  Advised OTC naproxen or Advil plus Tylenol Extra Strength plus muscle relaxer.  I wrote detailed instructions of timing and how to take it.  We discussed side effects in detail.  She will be going on a skiing trip soon, I did advise her to avoid skiing if she is having back problems as this will worsen her symptoms, will be difficult for her to remain  balanced.  I gave her a letter to give to the scanning company in case she still having back problems.    Leg cramps-ongoing issue, subsequent encounter.  Her lab work was generally unremarkable.  Advised her to try magnesium glycinate as it helps with cramps and will trial that for sx improvement.    Patient and I discussed diet/nutrition, lifestyle modifications, safety, medication indications and side effects, and health goals.    orders and follow up as documented in EMR, lab results reviewed with patient, reviewed compliance with lifestyle measures, reviewed diet, exercise and weight control, reviewed medications and side effects in detail     Return visit in several months when she returns from [facility]..           HERO READ MD, [facility]  Department of Family Medicine of [facility] - Primary Care

## 2025-01-14 NOTE — LETTER
January 14, 2025     Patient: Nancie Sands   YOB: 1955   Date of Visit: 1/14/2025       To Whom It May Concern:    Nancie Sands was seen in my clinic on 1/14/2025 at 3:40 pm. She came in for a new back pain. I would recommend that she does not ski at this time due to the pain and due to the fact it will worsen her back pain as activity can make it worse.    If you have any questions or concerns, please don't hesitate to call.         Sincerely,         Yolanda Gonzalez MD        CC: No Recipients

## 2025-01-14 NOTE — PATIENT INSTRUCTIONS
You can do extra strength tylenol three times a day - morning, noon, and the evening.     You can use the Zanaflex up to every 6 hours as needed. OR you can take it at night only.     You can take an Advil or Aleve 250 mg every 6-8 hours as needed.    Magnesium Glycinate for your cramps.

## 2025-02-12 ENCOUNTER — TELEPHONE (OUTPATIENT)
Dept: PRIMARY CARE | Facility: CLINIC | Age: 70
End: 2025-02-12
Payer: COMMERCIAL

## 2025-02-12 NOTE — TELEPHONE ENCOUNTER
PT OV was coded incorrectly on last visit must be code as preventive care or company insurance will go up to 10% please advise

## 2025-02-12 NOTE — TELEPHONE ENCOUNTER
The visit was billed as preventative care for age >65.     We talked about an acute issue so that is why she may have gotten a copay.    Preventative only visits mean that there are no uncontrolled or new issues occurring.